# Patient Record
Sex: MALE | Race: BLACK OR AFRICAN AMERICAN
[De-identification: names, ages, dates, MRNs, and addresses within clinical notes are randomized per-mention and may not be internally consistent; named-entity substitution may affect disease eponyms.]

---

## 2018-06-06 ENCOUNTER — HOSPITAL ENCOUNTER (INPATIENT)
Dept: HOSPITAL 68 - SDA | Age: 78
LOS: 2 days | Discharge: HOME HEALTH | DRG: 470 | End: 2018-06-08
Attending: ORTHOPAEDIC SURGERY | Admitting: ORTHOPAEDIC SURGERY
Payer: COMMERCIAL

## 2018-06-06 VITALS — WEIGHT: 235 LBS | BODY MASS INDEX: 33.64 KG/M2 | HEIGHT: 70 IN

## 2018-06-06 VITALS — DIASTOLIC BLOOD PRESSURE: 90 MMHG | SYSTOLIC BLOOD PRESSURE: 120 MMHG

## 2018-06-06 VITALS — DIASTOLIC BLOOD PRESSURE: 65 MMHG | SYSTOLIC BLOOD PRESSURE: 110 MMHG

## 2018-06-06 DIAGNOSIS — M17.12: Primary | ICD-10-CM

## 2018-06-06 DIAGNOSIS — G47.33: ICD-10-CM

## 2018-06-06 DIAGNOSIS — Z85.46: ICD-10-CM

## 2018-06-06 DIAGNOSIS — R00.2: ICD-10-CM

## 2018-06-06 DIAGNOSIS — N40.0: ICD-10-CM

## 2018-06-06 DIAGNOSIS — F51.9: ICD-10-CM

## 2018-06-06 DIAGNOSIS — L30.9: ICD-10-CM

## 2018-06-06 DIAGNOSIS — K21.9: ICD-10-CM

## 2018-06-06 DIAGNOSIS — Z88.6: ICD-10-CM

## 2018-06-06 DIAGNOSIS — Z96.642: ICD-10-CM

## 2018-06-06 PROCEDURE — C1713 ANCHOR/SCREW BN/BN,TIS/BN: HCPCS

## 2018-06-06 PROCEDURE — 0SRD0J9 REPLACEMENT OF LEFT KNEE JOINT WITH SYNTHETIC SUBSTITUTE, CEMENTED, OPEN APPROACH: ICD-10-PCS | Performed by: ORTHOPAEDIC SURGERY

## 2018-06-06 PROCEDURE — C9290 INJ, BUPIVACAINE LIPOSOME: HCPCS

## 2018-06-06 PROCEDURE — 3E0U33Z INTRODUCTION OF ANTI-INFLAMMATORY INTO JOINTS, PERCUTANEOUS APPROACH: ICD-10-PCS | Performed by: ORTHOPAEDIC SURGERY

## 2018-06-06 PROCEDURE — 2NBSP: CPT

## 2018-06-06 NOTE — PN- STUDENT
Waqar Douglas 06/06/18 1619:
Subjective
Subjective:
POST OP CHECK
PATIENT REPORTS FEELING PROGRESSIVE SORENESS OF LEFT KNEE. PAITENT REPORTS 
LIMITED FEELING IN LEFT LOWER EXTREMITY. PAITENT DENIES NAUSEA, VOMITING, 
SHORTNESS OF BREATH, CHEST PAIN OR PALPITATIONS.
 
Objective
Objective:
GENERAL: NO ACUTE DISTRESS, PAITIENT ORIENTED TO TIME AND PLACE
CHEST: S1 AND S2 HEARD
PULMONARY: LUNGS CLEAR BILATERALLY
EXTREMITIES:RANGE OF MOTION OF LEFT KNEE 0 TO 45 DEGREES. 4/5 STRENGTH OF LEFT 
KNEE AND HIP WITH STRAIGHT LEG RAISE. 4/5 STRENGTH WITH PLANTAR AND DORSI 
FLEXION OF FOOT. CAPILLARY REFILL 2 SECONDS IN LEFT TOES. GROSS SENSATION OF 
LOWER EXTREMITIES INTACT BILATERALLY.  FULL RANGE OF MOTION AND 5/5 MOTOR 
STRENGTH WITH STRAIGHT LEG RAISE AND DORSIFLEXION IN RIGHT LEG. DORSALIS PEDIS 
AND POSTERIOR TIBALIS PULSE INCTACT ON RIGHT FOOT. CALVES SOFT AND NONTENDER 
BILATERALLY
DRESSING IS CLEAN AND DRY.
 
Results
Results:
VITALS
BP: 138/80
HR: 70
RR: 20
o2 SAT: 96% ON 2L
TEMP: 96.8
 
Assessment/Plan
Assessment:
DENISHATIENT IS A 77 YEAR OLD MALE POST OP DAY 0 FOR LEFT TOTAL KNEE REPLACEMENT
PAST MEDICAL HISTORY OF DEGENERATIVE JOINT DISEASE, PROSTATE CANCER, GI BLEED 30
YEARS AGO
PATIENT IS DOING WELL
Plan:
CONTINUE ALPS AND ASPARIN FOR DVT PROPHYLAXIS
CONTINUE PAIN CONTROL REGMINE
CONTINUE CEFAZOLIN 2GM Q8 HOURS FOR 2 BAGS
CONTINUE DEXAMETHASONE AND OR ZOFRAN FOR NAUSEA
STOMACH PROPHYLAXIS SECONDARY TO HISTORY OF GI BLEED
WEIGHT BEARING AS TOLERATED
ACTIVITY OUT OF BED WITH ASSISTANCE
 
Camelia Hobson 06/08/18 0930:
Assessment/Plan
Assessment:
pt seen by pa team, not by myself, please refer to orthopedic progress note

## 2018-06-06 NOTE — SURGICAL DISCHARGE SUMMARY
Visit Information
 
Visit Dates
Admission Date:
06/06/18
 
Discharge Date:
6/8/18
 
 
History of Present Illness
Chief Complaint:
Left knee pain related to unilateral primary osteoarthritis
 
Medical History
Isolation History: Standard
 
Surgical History
Pertinent Surgical History: non-contributory
Review of Systems:
See H&P
 
Hospital Course
 
Course
Attending Physician:
Ezequiel Padron MD
 
Primary Care Physician:
Jose Espinoza MD
 
Hospital Course:
 
Patient was admitted to the hospital for an elective total joint replacement.  
The procedure was tolerated well and patient was transferred to a general 
surgical floor.  Diet was advanced and tolerated.  The patient was evaluated and
treated by physical therapy.  At the time of hospital discharge, the vital signs
were stable, neurovascular status was intact, and pain was controlled with the 
use of oral pain medications.
Allergies:
Coded Allergies:
No Known Allergies (06/05/18)
 
Significant Procedures:
left total knee arthroplasty
 
Disposition Summary
 
Disposition
Principal Diagnosis:
Left knee unilateral primary osteoarthritis
Additional Diagnosis:
None
Discharge Disposition: home health services
 
Discharge Instructions
 
General Discharge Information
Code Status: Full Code
Patient's Diet:
Regular, advance as tolerated
Patient's Activity:
WBAT
Follow-Up Instructions/Appts:
Follow up with Dr. Padron in 6 weeks from date of surgery, please call office 
to arrange/confirm this appointment
 
Medications at Discharge
Discharge Medications:
Continue taking these medications:
Esomeprazole (Nexium) 40 MG CAPSULE.DR
    1 Capsule ORAL DAILY
 
Acetaminophen (Extra Strength Non-Aspirin) 500 MG TABLET
    2 Tablet ORAL  as needed for PAIN
 
Start taking the following new medications:
Aspirin (Ecotrin*) 325 MG TABLET.DR
    1 Tablet ORAL TWICE DAILY
    Qty = 60
    No Refills
 
Hydromorphone HCl (Dilaudid) 2 MG TABLET
    1-2 Tablet ORAL EVERY 4-6 HOURS AS NEEDED as needed for PAIN
    Qty = 36
    No Refills
 
Docusate Sodium (Colace) 100 MG CAPSULE
    1 Capsule ORAL TWICE DAILY
    Qty = 14
    No Refills
    Instructions:
       DISCONTINUE USE IF YOU DEVELOP LOOSE STOOL OR DIARRHEA
 
Polyethylene Glycol 3350 (Miralax) 17 GRAM POWD.PACK
    1 Packet ORAL DAILY
    Qty = 7
    No Refills
    Instructions:
       dissolve in water, DISCONTINUE USE IF YOU DEVELOP LOOSE STOOL OR
       DIARRHEA

## 2018-06-06 NOTE — ADMISSION CORE MEASURES
Acute Coronary Syndrome (CM)
 
ACS Core Measures
Acute Coronary Syndrome Diagnosis No
 
Congestive Heart Failure (NEW)
 
CHF Core Measures
Congestive Heart Failure Diagnosis No
 
Cerebrovascular Accident AUG17
 
CVA Core Measures
CVA/TIA Diagnosis No
 
Venous Thromboembolism AUG17
 
VTE Core Tierney (View Protocol)
VTE Risk Factors Surgery
No Mechanical VTE Prophylaxis d/t N/A MechProphylax Ordered
No VTE Pharm Prophylaxis d/t NA PharmProphylax ordered
 
Problem List
 
As ranked by this Provider
includes Assessment & Plan
 1. Unilateral primary osteoarthritis, left knee
 
 
HOME MEDS
Home Med List
Acetaminophen (Extra Strength Non-Aspirin) 500 MG TABLET   2 TAB PO PRN PAIN  (
Reported)
Esomeprazole (Nexium) 40 MG CAPSULE.DR   1 CAP PO DAILY GI  (Reported)

## 2018-06-06 NOTE — PN- ORTHOPEDIC
**See Addendum**
Subjective
Subjective:
Postop check:
Patient resting comfortably, no complaints
 
Objective
Vital Signs and I&Os
 Intake & Output
 
 
 06/06 1600 06/06 0800 06/06 0000 06/05 1600 06/05 0800 06/05 0000
 
Intake Total      
 
Output Total      
 
Balance      
 
       
 
Patient    225 lb  
 
Weight      
 
 
Vital signs stable, afebrile
Physical Exam:
Well-developed well-nourished no apparent distress.
HEENT: Atraumatic, extraocular motion intact
Neck: Supple, no lymphadenopathy
Respiratory: No respiratory distress
Extremities: No edema 
LEFT lower extremity dressing in place, clean dry and intact
Compression wrap in place.  
ALPS in place
Neurovascularly intact distally
Bilateral calves are supple, nontender.
Neuro: Alert and oriented x3
Psych: Mood affect normal, normal memory normal judgment.
Skin: Warm and dry, no rash on exposed skin
 
Assessment/Plan
Assessment/Plan
postop day #0 status post left total knee arthroplasty Perioperative 
antibiotics.
 
Pain medication as needed.
Out of bed 
Physical therapy, weightbearing as tolerated
IV fluids
Regular diet
Follow a.m. labs
Aspirin for DVT prophylaxis
ALPS for DVT prophylaxis
Regular home meds
Dressing change postop day 2
 
 
 
 
Core Measures
 
Venous Thromboembolism
VTE Risk Factors Surgery
No Mechanical VTE Prophylaxis d/t N/A MechProphylax Ordered
No VTE Pharm Prophylaxis d/t NA PharmProphylax ordered

## 2018-06-06 NOTE — PATIENT DISCHARGE INSTRUCTIONS
Discharge Instructions
 
General Discharge Information
You were seen/treated for:
Left knee pain related to unilateral primary osteoarthritis
You had these procedures:
Left total knee replacement
Watch for these problems:
Increasing pain despite the use of pain medication 
Increasing redness, warmth or swelling
Drainage of any type from incision
Inability to bear weight on operative leg
Persistent nausea and vomiting
Fever greater than 101.5 degrees
Do not soak the wound: Yes
No bath, but you may shower: Yes
Other wound care:
Please keep wound clean and dry.  
 
No ointments or lotions of any type on or near incision at any time.  No 
exceptions.
 
Your dressing will be changed by your nurse on the second day after your 
surgery.  Daily dry dressing changes are recommended each day thereafter.  
 
Do not soak your wound in a bath at any time until otherwise indicated by your 
surgeon.  You may shower, please dry wound immediately after shower with a clean
towel.
Special Instructions:
 
Aspirin:  You are taking this medication to help prevent  blood clot formation. 
Please take with food to protect your stomach lining.  Please take as directed.
 
Constipation:  Pain medication can cause constipation.  Dr. Padron has 
recommended that you take Colace and miralax each day.  You may discontinue this
medication if you develop loose stool or diarrhea.  If you wish to continue this
medication, it is available over the counter.  If you are unable to move your 
bowels after several days, if you are unable to pass gas and are developing 
bloating, nausea, or vomiting as a result, please contact your doctor.
 
Activity
Full Activity/No Limits: No
Activity Self Limited: Yes
Pounds, do NOT lift more than: 10
 
Acute Coronary Syndrome
 
Inclusion Criteria
At DC or during hospital stay patient has or had the following:
ACS DIAGNOSIS No
 
Discharge Core Measures
Meds if any: Prescribed or Continued at Discharge
Meds if any: NOT Prescribed or Continued at Discharge
 
Congestive Heart Failure
 
Inclusion Criteria
At DC or during hospital stay patient has or had the following:
CHF DIAGNOSIS No
 
Discharge Core Measures
Meds if any: Prescribed or Continued at Discharge
Meds if any: NOT Prescribed or Continued at Discharge
 
Cerebrovascular accident
 
Inclusion Criteria
At DC or during hospital stay patient has or had the following:
CVA/TIA Diagnosis No
 
Discharge Core Measures
Meds if any: Prescribed or Continued at Discharge
Meds if any: NOT Prescribed or Continued at Discharge
 
Venous thromboembolism
 
Inclusion Criteria
VTE Diagnosis No
VTE Type NONE
VTE Confirmed by (Test) NONE
 
Discharge Core Measures
- Per Current guidelines, there needs to be overlap
- treatment for the first 5 days of Warfarin therapy.
- If discharged on Warfarin prior to 5 days of
- overlap therapy, the patient will need to be
- assessed for post discharge needs including
- *Post discharge parental anticoagulation
- *Warfarin and/or parental anticoagulation education
- *Follow up date to check INR post discharge
At least 5 days overlap therapy as Inpatient No
Meds if any: Prescribed or Continued at Discharge
Note: Overlap Therapy is Warfarin and Anticoagulant
Meds if any: NOT Prescribed or Continued at Discharge

## 2018-06-07 VITALS — DIASTOLIC BLOOD PRESSURE: 68 MMHG | SYSTOLIC BLOOD PRESSURE: 122 MMHG

## 2018-06-07 VITALS — DIASTOLIC BLOOD PRESSURE: 78 MMHG | SYSTOLIC BLOOD PRESSURE: 150 MMHG

## 2018-06-07 VITALS — SYSTOLIC BLOOD PRESSURE: 112 MMHG | DIASTOLIC BLOOD PRESSURE: 72 MMHG

## 2018-06-07 LAB
ABSOLUTE GRANULOCYTE CT: 7.1 /CUMM (ref 1.4–6.5)
BASOPHILS # BLD: 0 /CUMM (ref 0–0.2)
BASOPHILS NFR BLD: 0.2 % (ref 0–2)
EOSINOPHIL # BLD: 0 /CUMM (ref 0–0.7)
EOSINOPHIL NFR BLD: 0 % (ref 0–5)
ERYTHROCYTE [DISTWIDTH] IN BLOOD BY AUTOMATED COUNT: 13.9 % (ref 11.5–14.5)
GRANULOCYTES NFR BLD: 76.1 % (ref 42.2–75.2)
HCT VFR BLD CALC: 33.7 % (ref 42–52)
LYMPHOCYTES # BLD: 1.6 /CUMM (ref 1.2–3.4)
MCH RBC QN AUTO: 30.4 PG (ref 27–31)
MCHC RBC AUTO-ENTMCNC: 33.9 G/DL (ref 33–37)
MCV RBC AUTO: 89.6 FL (ref 80–94)
MONOCYTES # BLD: 0.6 /CUMM (ref 0.1–0.6)
PLATELET # BLD: 213 /CUMM (ref 130–400)
PMV BLD AUTO: 9 FL (ref 7.4–10.4)
RED BLOOD CELL CT: 3.76 /CUMM (ref 4.7–6.1)
WBC # BLD AUTO: 9.3 /CUMM (ref 4.8–10.8)

## 2018-06-07 NOTE — PN- ORTHOPEDIC
Subjective
Subjective:
pod#1 s/p keft tka
 
rr called for near syncope
according to nurse had jsut received morphine for pain
now awake alert conversive
denies cp, sob, no n+v
ekg done at bedside unchanged fro preop ekg
troponin pending
 
Objective
Vital Signs and I&Os
Vital Signs
 
 
Date Time Temp Pulse Resp B/P B/P Pulse O2 O2 Flow FiO2
 
     Mean Ox Delivery Rate 
 
06/07 0826  74  112/72     
 
06/07 0640 98.2 74 20 112/72  94 Room Air  
 
06/06 2320 98.0 83 20 110/65  95 Room Air  
 
06/06 1600 97.7 62 18 120/90  96 Room Air  
 
 
 Intake & Output
 
 
 06/07 1600 06/07 0800 06/07 0000 06/06 1600 06/06 0800 06/06 0000
 
Intake Total  600 300   
 
Output Total  900 350   
 
Balance  -300 -50   
 
       
 
Intake, IV  600 300   
 
Output, Urine  900 350   
 
Patient    235 lb  
 
Weight      
 
Weight    Reported by Patient  
 
Measurement      
 
Method      
 
 
 
Physical Exam:
cv: rrr
lungs: clear
abd: soft. +bs
ext: drsg dry
      distal cms intact
 
labs: wnl
 
Assessment/Plan
Assessment/Plan
orto stable
syncope likely due to morphne given just prior
 
plan
back in bed now
f/u torponin
wean narcs
monitor
alexandr attemp oob with pt this afternoon if improved
dr goss made aware
 
 
Core Measures
 
Venous Thromboembolism
VTE Risk Factors Surgery
No Mechanical VTE Prophylaxis d/t N/A MechProphylax Ordered
No VTE Pharm Prophylaxis d/t NA PharmProphylax ordered

## 2018-06-08 VITALS — DIASTOLIC BLOOD PRESSURE: 90 MMHG | SYSTOLIC BLOOD PRESSURE: 147 MMHG

## 2018-06-08 VITALS — DIASTOLIC BLOOD PRESSURE: 83 MMHG | SYSTOLIC BLOOD PRESSURE: 150 MMHG

## 2018-06-08 NOTE — PN- STUDENT
Waqar Douglas 06/08/18 0656:
Subjective
Subjective:
PATIENT REPORTS 3-4/10 LEFT KNEE PAIN. PATIENT STATES THAT PAIN HAS IMPROVED 
SINCE SURGERY. PATIENT DENIES CHEST PAIN, RACING HEART BEAT, SHORTNESS OF BREATH
, CALF PAIN, AND NAUSEA AND VOMMITING. ARCADIO REPORTS HE HAS BEEN OUT OF BED "A
COUPLE OF TIMES"
 
Objective
Objective:
VITALS
TEMP: 98.3 ORALLY
PULSE RATE 83
RESPIRATORY RATE 74
BLOOD PRESSURE 112/72
PULSE OX: 95% ROOM AIR
 
GENERAL: NO FEVER OR CHILLS. ANDREST ALERT AND ORIENTED TO TIME AND PLACE.
HEART: S1 AND S2 HEARD
LUNGS: CLEAR BILTERALLY
EXTREMITIES: FULL RANGE OF MOTION WITH RIGHT LEG AND 0 TO 30 WITH LEFT KNEE. 
STRENGTH WITH STRAIGHT LEG RAISE IS 5/5 FOR THE RIGHT LEG AND 2/5 FOR THE LEFT 
LEG. 5/5 STRENGHT WITH PLANTAR AND DORIFLEXION BILATERALLY. SENSATION IS INTACT 
BILATERALLY. CAPILLARY REFILL OF TOES IS 2 SECONDS BILATERLLY. DRESSING ON LEFT 
LEG IS CLEAN AND INTACT.
 
Results
Results:
Laboratory Tests
 
06/07/18 0949:
Troponin I < 0.01
 
06/07/18 0645:
Anion Gap 10, Estimated GFR > 60, BUN/Creatinine Ratio 16.0, CBC w Diff NO MAN 
DIFF REQ, RBC 3.76  L, MCV 89.6, MCH 30.4, MCHC 33.9, RDW 13.9, MPV 9.0, Gran % 
76.1  H, Lymphocytes % 16.7  L, Monocytes % 7.0, Eosinophils % 0, Basophils % 
0.2, Absolute Granulocytes 7.1  H, Absolute Lymphocytes 1.6, Absolute Monocytes 
0.6, Absolute Eosinophils 0, Absolute Basophils 0
 
 
Assessment/Plan
Assessment:
ARCADIO IS A 77 YEAR OLD MALE PO DAY 2 FROM LEFT TOTAL KNEE REPLACEMENT.
ARCADIO IS DOING WELL WITH IMPROVING PAIN OF Left knee pain
Plan:
continue pain control plan
continue ASA for DVT prophylaxis
ambulation with assistance if arcadio free of syncopal episodes since stopping 
morphine
continue home Karthikeyan Bass 06/08/18 0810:
Subjective
Subjective:
COMFORTABLE
NO C/O OF DIZZINESS SINCE YESTERDAY MORNING
AMBULATED WITH PT YESTERDAY AFTERNOON
DRSG CHANGED TODAY, WOUND C/D/I, NO CALF TENDERNESS, DISTAL CMS INTACT
 
PLAN
TITRATE PAIN MEDS
CONT OOB WITH PT/STAIRS
ASA/ALPS FOR DVT PROPHYLAXIS
HOME D/C PLANNING
